# Patient Record
Sex: MALE | HISPANIC OR LATINO | Employment: PART TIME | URBAN - METROPOLITAN AREA
[De-identification: names, ages, dates, MRNs, and addresses within clinical notes are randomized per-mention and may not be internally consistent; named-entity substitution may affect disease eponyms.]

---

## 2021-08-02 ENCOUNTER — OFFICE VISIT (OUTPATIENT)
Dept: URGENT CARE | Facility: CLINIC | Age: 41
End: 2021-08-02
Payer: COMMERCIAL

## 2021-08-02 ENCOUNTER — HOSPITAL ENCOUNTER (OUTPATIENT)
Facility: MEDICAL CENTER | Age: 41
End: 2021-08-02
Attending: PHYSICIAN ASSISTANT
Payer: COMMERCIAL

## 2021-08-02 VITALS
BODY MASS INDEX: 25.33 KG/M2 | HEIGHT: 69 IN | TEMPERATURE: 98.1 F | OXYGEN SATURATION: 97 % | DIASTOLIC BLOOD PRESSURE: 70 MMHG | HEART RATE: 70 BPM | WEIGHT: 171 LBS | RESPIRATION RATE: 18 BRPM | SYSTOLIC BLOOD PRESSURE: 120 MMHG

## 2021-08-02 DIAGNOSIS — R10.9 CHRONIC ABDOMINAL PAIN: ICD-10-CM

## 2021-08-02 DIAGNOSIS — R05.9 COUGH: ICD-10-CM

## 2021-08-02 DIAGNOSIS — G89.29 CHRONIC ABDOMINAL PAIN: ICD-10-CM

## 2021-08-02 LAB — COVID ORDER STATUS COVID19: NORMAL

## 2021-08-02 PROCEDURE — U0003 INFECTIOUS AGENT DETECTION BY NUCLEIC ACID (DNA OR RNA); SEVERE ACUTE RESPIRATORY SYNDROME CORONAVIRUS 2 (SARS-COV-2) (CORONAVIRUS DISEASE [COVID-19]), AMPLIFIED PROBE TECHNIQUE, MAKING USE OF HIGH THROUGHPUT TECHNOLOGIES AS DESCRIBED BY CMS-2020-01-R: HCPCS

## 2021-08-02 PROCEDURE — U0005 INFEC AGEN DETEC AMPLI PROBE: HCPCS

## 2021-08-02 PROCEDURE — 99214 OFFICE O/P EST MOD 30 MIN: CPT | Performed by: PHYSICIAN ASSISTANT

## 2021-08-02 RX ORDER — IBUPROFEN 200 MG
200 TABLET ORAL EVERY 6 HOURS PRN
COMMUNITY

## 2021-08-02 ASSESSMENT — ENCOUNTER SYMPTOMS
PALPITATIONS: 0
SPUTUM PRODUCTION: 0
SHORTNESS OF BREATH: 0
FEVER: 1
SORE THROAT: 0
WHEEZING: 0
CHILLS: 0
HEMOPTYSIS: 0
COUGH: 1

## 2021-08-02 NOTE — PROGRESS NOTES
Subjective:   Luisito Wills is a 41 y.o. male who presents for Cough (x5days, fever yesterday)      Cough  Associated symptoms include a fever. Pertinent negatives include no chest pain, chills, ear pain, hemoptysis, sore throat, shortness of breath or wheezing.       Review of Systems   Constitutional: Positive for fever and malaise/fatigue. Negative for chills.   HENT: Positive for congestion. Negative for ear pain and sore throat.    Respiratory: Positive for cough. Negative for hemoptysis, sputum production, shortness of breath and wheezing.    Cardiovascular: Negative for chest pain and palpitations.   All other systems reviewed and are negative.      Medications:    • azithromycin Tabs  • hydrocod polst-chlorphen polst Lqcr  • naproxen Tabs  • oxyCODONE-acetaminophen Tabs  • Phenylephrine-Pheniramine-DM Pack  • ROBITUSSIN COLD & COUGH PO    Allergies: Patient has no known allergies.    Problem List: Luisito Wills does not have a problem list on file.    Surgical History:  No past surgical history on file.    Past Social Hx: Luisito Wills  reports that he has never smoked. He does not have any smokeless tobacco history on file. He reports current alcohol use. He reports that he does not use drugs.     Past Family Hx:  Luisito Wills family history is not on file.     Problem list, medications, and allergies reviewed by myself today in Epic.     Objective:     There were no vitals taken for this visit.    Physical Exam  Vitals reviewed.   Constitutional:       General: He is not in acute distress.     Appearance: He is well-developed. He is not ill-appearing or toxic-appearing.      Interventions: He is not intubated.  HENT:      Head: Normocephalic and atraumatic.      Right Ear: Hearing, tympanic membrane, ear canal and external ear normal.      Left Ear: Hearing, tympanic membrane, ear canal and external ear normal.      Nose: Nose normal.      Mouth/Throat:       Pharynx: Uvula midline.   Eyes:      General: Lids are normal.      Conjunctiva/sclera: Conjunctivae normal.   Cardiovascular:      Rate and Rhythm: Regular rhythm.      Heart sounds: Normal heart sounds, S1 normal and S2 normal. No murmur heard.   No friction rub. No gallop.    Pulmonary:      Effort: Pulmonary effort is normal. No tachypnea, bradypnea, accessory muscle usage or respiratory distress. He is not intubated.      Breath sounds: Normal breath sounds. No decreased breath sounds, wheezing, rhonchi or rales.   Chest:      Chest wall: No tenderness.   Musculoskeletal:         General: Normal range of motion.      Cervical back: Full passive range of motion without pain, normal range of motion and neck supple.   Skin:     General: Skin is warm and dry.   Neurological:      Mental Status: He is alert and oriented to person, place, and time.   Psychiatric:         Speech: Speech normal.         Behavior: Behavior normal.         Thought Content: Thought content normal.         Judgment: Judgment normal.         Assessment/Plan:     Medical Decision Making/Comments     Pt is a 41 yr old male who presents for evaluation of cough.  Pt states 5 days of cough and fever.  He is vaccinated with JJ.  Pt denies SOB.  PMH: no history of tobacco exposure, CHF, COPD, asthma , history of thrombosis. Vital signs are normal.  Pt appears well and is in no acute distress.  Lung auscultation is clear with no signs of consolidation or wheezing.  No pitting edema of the lower extremities.  With no signs of tachycardia, tachypnea, fever, or rales likely does not have pneumonia. Pt is low risk of PE per Wells criteria and is PERC NEG.  Likely viral uri.    Diagnosis differential includes but not limited to:     Acute: Viral URI, post infectious cough, CHF,asthma, bronchitis, pneumonia, allergic rhinitis, chemical irritant, foreign body aspiration, PE.   Chronic: GERD, smoking, COPD, CHF, Lung Cancer, ACE/ARB, pertussis, TB       Diagnosis and associated orders     1. Cough  SARS-CoV-2 PCR (24 hour In-House): Collect NP swab in VTM    Hydrocod Polst-CPM Polst ER (TUSSIONEX) 10-8 MG/5ML Suspension Extended Release     - isolate for 24-72 hrs while pcr test is pending  - treat symptoms with OTC medications           Differential diagnosis, natural history, supportive care, and indications for immediate follow-up discussed.    Advised the patient to follow-up with the primary care physician for recheck, reevaluation, and consideration of further management.    Please note that this dictation was created using voice recognition software. I have made a reasonable attempt to correct obvious errors, but I expect that there are errors of grammar and possibly content that I did not discover before finalizing the note.

## 2021-08-02 NOTE — LETTER
August 2, 2021         Patient: Luisito Wills   YOB: 1980   Date of Visit: 8/2/2021           To Whom it May Concern,     Your employee was seen in our clinic today. A concern for COVID-19 has been identified and testing is in progress.    We are asking you to excuse absences while following self-isolation protocol per Center for Disease Control (CDC) guidelines. Your employee will be able to access test results through our electronic delivery system called "[employer].    If the results of testing are negative, and once there has been no fever (temperature >100.4 F) for at least 72 hours without treatment, and no vomiting or diarrhea for at least 48 hours, then return to work is approved.     If the results of testing are positive then your employee will be contacted by the [facility] or [facility] for further instructions on duration of self-isolation and return to work protocol. In general, this will also follow the CDC guidelines with a minimum of 10 days from the onset of symptoms and without fever, vomiting, or diarrhea as above.    In general, repeat testing is not necessary and not offered through our [facility].    This is the only note that will be provided from [facility] for this visit. Your employee will require an appointment with a primary care provider if FMLA or disability forms are required.     Sincerely,             Dante Perdomo P.A.-C.  Electronically Signed

## 2021-08-03 LAB
SARS-COV-2 RNA RESP QL NAA+PROBE: NOTDETECTED
SPECIMEN SOURCE: NORMAL

## 2021-08-04 NOTE — RESULT ENCOUNTER NOTE
I called the patient and left a message re: COVID test results. I asked the patient to call our office if he has any further questions.    Elif

## 2022-12-15 ENCOUNTER — OFFICE VISIT (OUTPATIENT)
Dept: URGENT CARE | Facility: CLINIC | Age: 42
End: 2022-12-15
Payer: COMMERCIAL

## 2022-12-15 VITALS
TEMPERATURE: 98.4 F | HEART RATE: 58 BPM | SYSTOLIC BLOOD PRESSURE: 106 MMHG | OXYGEN SATURATION: 95 % | HEIGHT: 69 IN | RESPIRATION RATE: 16 BRPM | WEIGHT: 179.4 LBS | BODY MASS INDEX: 26.57 KG/M2 | DIASTOLIC BLOOD PRESSURE: 66 MMHG

## 2022-12-15 DIAGNOSIS — J06.9 VIRAL URI WITH COUGH: ICD-10-CM

## 2022-12-15 PROCEDURE — 99213 OFFICE O/P EST LOW 20 MIN: CPT | Performed by: PHYSICIAN ASSISTANT

## 2022-12-15 RX ORDER — DEXTROMETHORPHAN HYDROBROMIDE AND PROMETHAZINE HYDROCHLORIDE 15; 6.25 MG/5ML; MG/5ML
5 SYRUP ORAL EVERY 4 HOURS PRN
Qty: 120 ML | Refills: 0 | Status: SHIPPED | OUTPATIENT
Start: 2022-12-15

## 2022-12-15 RX ORDER — BENZONATATE 100 MG/1
100 CAPSULE ORAL 3 TIMES DAILY PRN
Qty: 21 CAPSULE | Refills: 0 | Status: SHIPPED | OUTPATIENT
Start: 2022-12-15

## 2022-12-15 NOTE — PROGRESS NOTES
"Subjective:   Luisito Wills is a 42 y.o. male who presents for Cough (X4 days) and Sinus Pain (X5 days )      HPI  The patient presents to the Urgent Care with complaints of URI symptoms onset 1 week.  Initially felt as if flulike symptoms with fevers and body aches the first couple days.  They seem to have resolved.  He has a gradually worsening cough and sinus pressure.  Feeling better overall but the cough is worsening. Denies any chest pain, SOB, vomiting, diarrhea. Nonsmoker. Influenza vaccine not up to date. Received COVID vaccine.       Medications:    azithromycin Tabs  hydrocod polst-chlorphen polst Lqcr  ibuprofen Tabs  naproxen Tabs  oxyCODONE-acetaminophen Tabs  Phenylephrine-Pheniramine-DM Pack  ROBITUSSIN COLD & COUGH PO    Allergies: Patient has no known allergies.    Problem List: Luisito Wills does not have a problem list on file.    Surgical History:  No past surgical history on file.    Past Social Hx: Luisito Wills  reports that he has never smoked. He has never used smokeless tobacco. He reports current alcohol use. He reports that he does not use drugs.     Past Family Hx:  Luisito Wills family history is not on file.     Problem list, medications, and allergies reviewed by myself today in Epic.     Objective:     /66 (BP Location: Left arm, Patient Position: Sitting, BP Cuff Size: Adult)   Pulse (!) 58   Temp 36.9 °C (98.4 °F) (Temporal)   Resp 16   Ht 1.753 m (5' 9\")   Wt 81.4 kg (179 lb 6.4 oz)   SpO2 95%   BMI 26.49 kg/m²     Physical Exam  Vitals reviewed.   Constitutional:       General: He is not in acute distress.     Appearance: Normal appearance. He is not ill-appearing or toxic-appearing.   HENT:      Head: Normocephalic.      Right Ear: Tympanic membrane normal.      Left Ear: Tympanic membrane normal.      Nose: Congestion present.      Mouth/Throat:      Mouth: Mucous membranes are moist.      Pharynx: Posterior " oropharyngeal erythema present. No oropharyngeal exudate.      Tonsils: No tonsillar abscesses.   Eyes:      Conjunctiva/sclera: Conjunctivae normal.      Pupils: Pupils are equal, round, and reactive to light.   Cardiovascular:      Rate and Rhythm: Normal rate and regular rhythm.      Heart sounds: Normal heart sounds.   Pulmonary:      Effort: Pulmonary effort is normal. No respiratory distress.      Breath sounds: Normal breath sounds. No wheezing, rhonchi or rales.   Musculoskeletal:      Cervical back: Neck supple. No rigidity.   Skin:     General: Skin is warm and dry.   Neurological:      General: No focal deficit present.      Mental Status: He is alert and oriented to person, place, and time.   Psychiatric:         Mood and Affect: Mood normal.         Behavior: Behavior normal.       Diagnosis and associated orders:     1. Viral URI with cough  - promethazine-dextromethorphan (PROMETHAZINE-DM) 6.25-15 MG/5ML syrup; Take 5 mL by mouth every four hours as needed for Cough.  Dispense: 120 mL; Refill: 0  - benzonatate (TESSALON) 100 MG Cap; Take 1 Capsule by mouth 3 times a day as needed for Cough.  Dispense: 21 Capsule; Refill: 0     Comments/MDM:     The patient's presenting symptoms and exam findings most likely are due to a viral etiology. Suspected influenza.   Symptomatic and supportive care:   Plenty of oral hydration and rest   Over the counter cough suppressant as directed.  Tylenol or ibuprofen for pain and fever as directed.   Warm salt water gargles for sore throat, soft foods, cool liquids.   Saline nasal spray and Flonase  Infection control measures at home. Stay away from people, Hand washing, covering sneeze/cough, disinfect surfaces.   Remain home from work, school, and other populated environments.   Overall, the patient is well-appearing. They are not hypoxic, afebrile, and a normal pulmonary exam.     I personally reviewed prior external notes and test results pertinent to today's visit.  Pathogenesis of diagnosis discussed including typical length and natural progression. Supportive care, natural history, differential diagnoses, and indications for immediate follow-up discussed. Patient expresses understanding and agrees to plan. Patient denies any other questions or concerns.     Follow-up with the primary care physician for recheck, reevaluation, and consideration of further management.    Please note that this dictation was created using voice recognition software. I have made a reasonable attempt to correct obvious errors, but I expect that there are errors of grammar and possibly content that I did not discover before finalizing the note.    This note was electronically signed by Trever Arreola PA-C

## 2023-02-28 ENCOUNTER — HOSPITAL ENCOUNTER (OUTPATIENT)
Dept: LAB | Facility: MEDICAL CENTER | Age: 43
End: 2023-02-28
Payer: COMMERCIAL

## 2023-03-13 ENCOUNTER — HOSPITAL ENCOUNTER (OUTPATIENT)
Dept: LAB | Facility: MEDICAL CENTER | Age: 43
End: 2023-03-13
Payer: COMMERCIAL

## 2023-03-13 PROCEDURE — 83013 H PYLORI (C-13) BREATH: CPT

## 2023-03-15 ENCOUNTER — HOSPITAL ENCOUNTER (OUTPATIENT)
Dept: LAB | Facility: MEDICAL CENTER | Age: 43
End: 2023-03-15
Payer: COMMERCIAL

## 2023-03-15 PROCEDURE — 83013 H PYLORI (C-13) BREATH: CPT

## 2023-03-17 LAB — UREA BREATH TEST QL: NEGATIVE

## 2023-03-28 ENCOUNTER — HOSPITAL ENCOUNTER (OUTPATIENT)
Dept: LAB | Facility: MEDICAL CENTER | Age: 43
End: 2023-03-28
Payer: COMMERCIAL

## 2023-03-28 LAB
CRP SERPL HS-MCNC: <0.3 MG/DL (ref 0–0.75)
ERYTHROCYTE [SEDIMENTATION RATE] IN BLOOD BY WESTERGREN METHOD: 3 MM/HOUR (ref 0–20)

## 2023-03-28 PROCEDURE — 85652 RBC SED RATE AUTOMATED: CPT

## 2023-03-28 PROCEDURE — 86364 TISS TRNSGLTMNASE EA IG CLAS: CPT

## 2023-03-28 PROCEDURE — 86036 ANCA SCREEN EACH ANTIBODY: CPT

## 2023-03-28 PROCEDURE — 86671 FUNGUS NES ANTIBODY: CPT | Mod: 91

## 2023-03-28 PROCEDURE — 86140 C-REACTIVE PROTEIN: CPT

## 2023-03-28 PROCEDURE — 82784 ASSAY IGA/IGD/IGG/IGM EACH: CPT

## 2023-03-28 PROCEDURE — 36415 COLL VENOUS BLD VENIPUNCTURE: CPT

## 2023-03-29 LAB — IGA SERPL-MCNC: 180 MG/DL (ref 68–408)

## 2023-03-30 LAB
ANCA IFA PATTERN Q6048: NORMAL
ANCA IFA TITER Q6047: NORMAL
BAKER'S YEAST IGA QN IA: 2.7 UNITS (ref 0–24.9)
BAKER'S YEAST IGG QN IA: 2.9 UNITS (ref 0–24.9)
EER INFLAMMATORY BOWEL DISEASES Q6049: NORMAL
IBD INTERP Q0082: NORMAL
TTG IGA SER IA-ACNC: <2 U/ML (ref 0–3)

## 2023-12-25 ENCOUNTER — OFFICE VISIT (OUTPATIENT)
Dept: URGENT CARE | Facility: CLINIC | Age: 43
End: 2023-12-25
Payer: COMMERCIAL

## 2023-12-25 VITALS
WEIGHT: 180 LBS | DIASTOLIC BLOOD PRESSURE: 74 MMHG | RESPIRATION RATE: 16 BRPM | TEMPERATURE: 99.4 F | OXYGEN SATURATION: 97 % | BODY MASS INDEX: 26.66 KG/M2 | HEART RATE: 61 BPM | HEIGHT: 69 IN | SYSTOLIC BLOOD PRESSURE: 116 MMHG

## 2023-12-25 DIAGNOSIS — J00 ACUTE NASOPHARYNGITIS: ICD-10-CM

## 2023-12-25 PROCEDURE — 3078F DIAST BP <80 MM HG: CPT

## 2023-12-25 PROCEDURE — 3074F SYST BP LT 130 MM HG: CPT

## 2023-12-25 PROCEDURE — 99213 OFFICE O/P EST LOW 20 MIN: CPT

## 2023-12-25 NOTE — PATIENT INSTRUCTIONS
Discharge instructions:    You have been diagnosed with a viral upper respiratory illness.  Antibiotics do not cure viral infections.  The treatment recommendations below will help you feel better while your body’s own defenses are fighting the virus. Stay hydrated.  Use a cool mist vaporizer to relieve congestion and any of the following:    For Cough:    Suppressants: Dextromethorphan (Delsym, Robitussin)    Expectorants: Guaifenesin (Mucinex)      For nasal congestion:    Nasal Spray for mild symptoms    Oxymetazoline 0.05% (Afrin) Do NOT use for more than 3 days    Phenylephrine 0.25% (Brennon-Synephrine)    Saline nasal spray (Ocean, simply saline)    or    Oral medications for more severe nasal congestion:    Phenylephrine (Sudafed PE)    Pseudoephedrine (Sudafed)    DO NOT USE IF YOU HAVE HIGH BLOOD PRESSURE      Headache; ear; throat; muscle, or joint pain or fever    Acetaminophen (Tylenol) 1,000 mg every 6 hours    Ibuprofen (Motrin) 400 mg every 6-8 hours      Sore Throat    Dyclonine lozenges (Sucrets)    Benzocaine spray or lozenges (Cepacol or Cetacaine spray)    Menthol lozenges (Cepacol, Vicks Vapodrops)    Salt water gargles      Runny nose, itchy and watery eyes, and sneezing    Antihistamine Spray (azelastine)    Corticosteroid nasal spray (Flonase)    Oral Antihistamine (Zyrtec, Claritin, allegra)         Use medications according to package instructions or directed by your healthcare provider. Stop the medications when your symptoms improve.  Brand names are given for convenience.  Any brand may be used as long as it has the same active ingredient.

## 2023-12-25 NOTE — PROGRESS NOTES
"Chief Complaint   Patient presents with    Sinus Problem     Sinus pressure, body fatigue, fatigue, mild cough   X 5 days          Subjective:   HISTORY OF PRESENT ILLNESS: Luisito Wills is a 43 y.o. male who presents for sinus pressure, body aches and fatigue x 5 days.   Patient denies fevers.  He would like antibx    Medications, Allergies, current problem list, Social and Family history reviewed today in Epic.     Objective:     /74   Pulse 61   Temp 37.4 °C (99.4 °F) (Temporal)   Resp 16   Ht 1.753 m (5' 9\")   Wt 81.6 kg (180 lb)   SpO2 97%     Physical Exam  Vitals reviewed.   Constitutional:       Appearance: Normal appearance. He is not toxic-appearing.   HENT:      Head:      Jaw: No trismus.      Right Ear: Tympanic membrane normal.      Left Ear: Tympanic membrane normal.      Nose: Congestion present. No rhinorrhea.      Mouth/Throat:      Mouth: Mucous membranes are moist.      Pharynx: Uvula midline. Posterior oropharyngeal erythema present. No pharyngeal swelling, oropharyngeal exudate or uvula swelling.      Tonsils: No tonsillar exudate or tonsillar abscesses. 1+ on the right. 1+ on the left.      Comments: No muffled voice, trismus, unilateral deviation of the uvula, soft palate fullness or edema. No oral airway compromise, or drooling noted.   Eyes:      Conjunctiva/sclera: Conjunctivae normal.   Cardiovascular:      Rate and Rhythm: Normal rate and regular rhythm.      Heart sounds: Normal heart sounds.   Pulmonary:      Effort: Pulmonary effort is normal. No respiratory distress.      Breath sounds: Normal breath sounds. No decreased breath sounds or wheezing.   Musculoskeletal:      Cervical back: Full passive range of motion without pain and neck supple.   Skin:     General: Skin is warm and dry.   Neurological:      Mental Status: He is alert and oriented to person, place, and time.   Psychiatric:         Mood and Affect: Mood normal.          Assessment/Plan: "     Diagnosis and associated orders    I personally reviewed prior external notes and test results pertinent to today's visit.     1. Acute nasopharyngitis              IMPRESSION:  Exam findings reassuring with stable vital signs, No red flag symptoms or exam findings. Explained that S/S are consistent with a viral illness and are self limiting.  No antibiotics are indicated at this time.     OTC symptomatic relief measures were recommended.  Supportive care also discussed to include the use of warm salt water gargles, saline nasal rinses, steam inhalation, and the use of a cool-mist humidifier in the bedroom at night.      Differential diagnosis discussed. Pt was Educated on red flag symptoms. Pt has been Instructed to return to Urgent Care or nearest Emergency Department if symptoms fail to improve, for any change in condition, further concerns, or new concerning symptoms. Patient states understanding of the plan of care and discharge instructions.  They are discharged in stable condition.         Please note that this dictation was created using voice recognition software. I have made a reasonable attempt to correct obvious errors, but I expect that there are errors of grammar and possibly content that I did not discover before finalizing the note.    This note was electronically signed by LONNY Valles